# Patient Record
Sex: FEMALE | Race: WHITE | NOT HISPANIC OR LATINO | Employment: FULL TIME | URBAN - METROPOLITAN AREA
[De-identification: names, ages, dates, MRNs, and addresses within clinical notes are randomized per-mention and may not be internally consistent; named-entity substitution may affect disease eponyms.]

---

## 2017-05-10 ENCOUNTER — HISTORICAL (OUTPATIENT)
Dept: LAB | Facility: HOSPITAL | Age: 37
End: 2017-05-10

## 2017-08-16 LAB — CRC RECOMMENDATION EXT: NORMAL

## 2018-11-15 ENCOUNTER — HISTORICAL (OUTPATIENT)
Dept: LAB | Facility: HOSPITAL | Age: 38
End: 2018-11-15

## 2019-04-08 ENCOUNTER — HISTORICAL (OUTPATIENT)
Dept: LAB | Facility: HOSPITAL | Age: 39
End: 2019-04-08

## 2019-11-19 LAB — HUMAN PAPILLOMAVIRUS (HPV): NORMAL

## 2020-01-27 ENCOUNTER — HISTORICAL (OUTPATIENT)
Dept: LAB | Facility: HOSPITAL | Age: 40
End: 2020-01-27

## 2021-01-08 LAB — BCS RECOMMENDATION EXT: NORMAL

## 2022-04-07 ENCOUNTER — HISTORICAL (OUTPATIENT)
Dept: ADMINISTRATIVE | Facility: HOSPITAL | Age: 42
End: 2022-04-07

## 2022-04-23 VITALS
DIASTOLIC BLOOD PRESSURE: 78 MMHG | WEIGHT: 171.94 LBS | SYSTOLIC BLOOD PRESSURE: 122 MMHG | BODY MASS INDEX: 30.46 KG/M2 | HEIGHT: 63 IN

## 2022-06-13 PROBLEM — I82.409 DEEP VEIN THROMBOSIS (DVT): Status: RESOLVED | Noted: 2022-06-13 | Resolved: 2022-06-13

## 2022-06-13 PROBLEM — J45.909 ASTHMA: Status: ACTIVE | Noted: 2022-06-13

## 2022-06-13 PROBLEM — G43.909 MIGRAINE HEADACHE: Status: ACTIVE | Noted: 2022-06-13

## 2022-06-13 PROBLEM — G47.8 ABNORMAL RAPID EYE MOVEMENT SLEEP: Status: ACTIVE | Noted: 2022-06-13

## 2022-06-13 PROBLEM — R53.83 FATIGUE: Status: ACTIVE | Noted: 2022-06-13

## 2022-06-13 PROBLEM — K21.9 GASTROESOPHAGEAL REFLUX DISEASE: Status: ACTIVE | Noted: 2022-06-13

## 2022-06-13 PROBLEM — F41.9 ANXIETY: Status: ACTIVE | Noted: 2022-06-13

## 2022-06-13 PROBLEM — K44.9 HIATAL HERNIA: Status: ACTIVE | Noted: 2022-06-13

## 2022-06-13 PROBLEM — Z86.718 HISTORY OF DVT (DEEP VEIN THROMBOSIS): Status: ACTIVE | Noted: 2022-06-13

## 2022-06-13 PROBLEM — D64.9 ANEMIA: Status: ACTIVE | Noted: 2022-06-13

## 2022-06-13 PROBLEM — R11.0 NAUSEA: Status: ACTIVE | Noted: 2022-06-13

## 2022-06-13 PROBLEM — F90.0 ATTENTION DEFICIT HYPERACTIVITY DISORDER (ADHD), PREDOMINANTLY INATTENTIVE TYPE: Status: ACTIVE | Noted: 2022-06-13

## 2022-06-13 PROBLEM — I10 HYPERTENSION: Status: ACTIVE | Noted: 2022-06-13

## 2022-06-13 PROBLEM — I82.409 DEEP VEIN THROMBOSIS (DVT): Status: ACTIVE | Noted: 2022-06-13

## 2022-06-13 PROCEDURE — 85379 FIBRIN DEGRADATION QUANT: CPT | Performed by: FAMILY MEDICINE

## 2022-07-05 ENCOUNTER — OFFICE VISIT (OUTPATIENT)
Dept: URGENT CARE | Facility: CLINIC | Age: 42
End: 2022-07-05
Payer: COMMERCIAL

## 2022-07-05 VITALS
WEIGHT: 176.38 LBS | RESPIRATION RATE: 18 BRPM | SYSTOLIC BLOOD PRESSURE: 117 MMHG | HEIGHT: 64 IN | DIASTOLIC BLOOD PRESSURE: 80 MMHG | BODY MASS INDEX: 30.11 KG/M2 | TEMPERATURE: 99 F | HEART RATE: 75 BPM | OXYGEN SATURATION: 100 %

## 2022-07-05 DIAGNOSIS — M79.5 FOREIGN BODY (FB) IN SOFT TISSUE: Primary | ICD-10-CM

## 2022-07-05 DIAGNOSIS — H60.02 ABSCESS OF TRAGUS OF LEFT EAR: ICD-10-CM

## 2022-07-05 PROCEDURE — 99213 OFFICE O/P EST LOW 20 MIN: CPT | Mod: S$PBB,,, | Performed by: NURSE PRACTITIONER

## 2022-07-05 PROCEDURE — 99213 PR OFFICE/OUTPT VISIT, EST, LEVL III, 20-29 MIN: ICD-10-PCS | Mod: S$PBB,,, | Performed by: NURSE PRACTITIONER

## 2022-07-05 PROCEDURE — 99214 OFFICE O/P EST MOD 30 MIN: CPT | Mod: PBBFAC | Performed by: NURSE PRACTITIONER

## 2022-07-05 RX ORDER — IBUPROFEN 800 MG/1
800 TABLET ORAL EVERY 6 HOURS PRN
Qty: 12 TABLET | Refills: 0 | Status: SHIPPED | OUTPATIENT
Start: 2022-07-05 | End: 2023-07-11

## 2022-07-05 RX ORDER — LIDOCAINE HYDROCHLORIDE 20 MG/ML
5 INJECTION, SOLUTION INFILTRATION; PERINEURAL ONCE
Status: COMPLETED | OUTPATIENT
Start: 2022-07-05 | End: 2022-07-05

## 2022-07-05 RX ORDER — MUPIROCIN 20 MG/G
OINTMENT TOPICAL
Qty: 22 G | Refills: 1 | Status: SHIPPED | OUTPATIENT
Start: 2022-07-05

## 2022-07-05 RX ORDER — CEPHALEXIN 500 MG/1
500 CAPSULE ORAL EVERY 8 HOURS
Qty: 15 CAPSULE | Refills: 0 | Status: SHIPPED | OUTPATIENT
Start: 2022-07-05 | End: 2022-07-10

## 2022-07-05 RX ADMIN — LIDOCAINE HYDROCHLORIDE 5 ML: 20 INJECTION, SOLUTION INFILTRATION; PERINEURAL at 08:07

## 2022-07-06 NOTE — PROGRESS NOTES
"Subjective:      Patient ID: Kortney Walker is a 42 y.o. female.    Chief Complaint: Ear Injury (Lt ear         ear ring "stuck" in ear piercing site x 1wk)      42-year-old female presents to the clinic reporting Left ear tragus abscess formation due to imbedded ear ring.  The swelling started few days ago, the person who had inserted the earring was unable to take it out due to swelling. Patient state tetanus shot up-to-date. Denies headache or blurry vision or dizziness.    Review of Systems   HENT: Positive for ear discharge and ear pain.    All other systems reviewed and are negative.      /80   Pulse 75   Temp 99 °F (37.2 °C)   Resp 18   Ht 5' 3.78" (1.62 m)   Wt 80 kg (176 lb 5.9 oz)   SpO2 100%   BMI 30.48 kg/m²      Current Outpatient Medications:     albuterol (PROVENTIL/VENTOLIN HFA) 90 mcg/actuation inhaler, INHALE TWO PUFFS BY MOUTH EVERY SIX HOURS AS DIRECTED, Disp: , Rfl:     cetirizine (ZYRTEC) 10 MG tablet, Take 10 mg by mouth once daily., Disp: , Rfl:     erenumab-aooe (AIMOVIG AUTOINJECTOR) 70 mg/mL autoinjector, Inject 70 mg into the skin., Disp: , Rfl:     gabapentin (NEURONTIN) 100 MG capsule, Take 2 capsules (200 mg total) by mouth 3 (three) times daily., Disp: 180 capsule, Rfl: 11    lisinopriL (PRINIVIL,ZESTRIL) 20 MG tablet,  See Instructions, TAKE 1 TABLET BY MOUTH EVERY MORNING, # 90 tab(s), 1 Refill(s), Pharmacy: [facility], 160, cm, Height/Length Dosing, 01/10/22 13:20:00 CST, 78, kg, Weight Dosing, 01/10/22 13:20:00 CST, Disp: , Rfl:     VIIBRYD 20 mg Tab, , Disp: , Rfl:     cephALEXin (KEFLEX) 500 MG capsule, Take 1 capsule (500 mg total) by mouth every 8 (eight) hours. for 5 days, Disp: 15 capsule, Rfl: 0    fluticasone propionate (FLONASE) 50 mcg/actuation nasal spray, Spray 1 squirt in the nostrils twice a day as needed, Disp: , Rfl:     ibuprofen (ADVIL,MOTRIN) 800 MG tablet, Take 1 tablet (800 mg total) by mouth every 6 (six) hours as " needed for Pain., Disp: 12 tablet, Rfl: 0    mupirocin (BACTROBAN) 2 % ointment, Apply to affected area 3 times daily For 5 days, Disp: 22 g, Rfl: 1    tacrolimus (PROTOPIC) 0.1 % ointment,  See Instructions, TOP BID apply and rub in well, # 454 gm, 1 Refill(s), Pharmacy: [facility], 160, cm, Height/Length Dosing, 01/10/22 13:20:00 CST, 78, kg, Weight Dosing, 01/10/22 13:20:00 CST, Disp: , Rfl:     valACYclovir (VALTREX) 1000 MG tablet, Take 2,000 mg by mouth 2 (two) times daily., Disp: , Rfl:     Current Facility-Administered Medications:     [COMPLETED] LIDOcaine HCL 20 mg/ml (2%) injection 5 mL, 5 mL, Intradermal, Once, Ted Anders FNP, 5 mL at 07/05/22 2015    Objective:     Physical Exam  Vitals and nursing note reviewed.   Constitutional:       General: She is not in acute distress.     Appearance: Normal appearance. She is not ill-appearing or toxic-appearing.   HENT:      Head: Normocephalic and atraumatic.      Right Ear: Tympanic membrane, ear canal and external ear normal.      Left Ear: Tympanic membrane, ear canal and external ear normal.      Ears:        Comments: Left ear tragus  earing stuck to infection and swelling. Erythemic, tender to touch, infected.     Nose: Nose normal.      Mouth/Throat:      Mouth: Mucous membranes are moist.      Pharynx: Oropharynx is clear.   Eyes:      Extraocular Movements: Extraocular movements intact.      Conjunctiva/sclera: Conjunctivae normal.      Pupils: Pupils are equal, round, and reactive to light.   Cardiovascular:      Rate and Rhythm: Normal rate and regular rhythm.      Pulses: Normal pulses.      Heart sounds: Normal heart sounds.   Pulmonary:      Effort: Pulmonary effort is normal. No respiratory distress.      Breath sounds: Normal breath sounds. No wheezing.   Musculoskeletal:         General: Normal range of motion.      Cervical back: Normal range of motion and neck supple. No rigidity or tenderness.   Lymphadenopathy:       Cervical: No cervical adenopathy.   Skin:     General: Skin is warm.      Capillary Refill: Capillary refill takes less than 2 seconds.   Neurological:      General: No focal deficit present.      Mental Status: She is alert and oriented to person, place, and time. Mental status is at baseline.   Psychiatric:         Mood and Affect: Mood normal.         Behavior: Behavior normal.         Thought Content: Thought content normal.         Judgment: Judgment normal.      procedure: patient gave verbal and written consent to remove hearing from left ear tragus. Time-out taken. MARCOS Corrigan to assist. Area cleaned with chlorhexidine topical, injected area with 25 gauge short needle, 3 mL lidocaine 2% without epi. It was pulled opposite direction. Patient tolerated procedure with minor discomfort, minimal bleeding. Patient decline x-rays to rule out any residual foreign body. Cleaned and covered with gauze. Keep the area clean admitting medications as prescribed.  Patient verbalized.  Assessment:     Problem List Items Addressed This Visit    None     Visit Diagnoses     Foreign body (FB) in soft tissue    -  Primary    Relevant Medications    LIDOcaine HCL 20 mg/ml (2%) injection 5 mL (Completed) (Start on 7/5/2022  8:15 PM)    cephALEXin (KEFLEX) 500 MG capsule    ibuprofen (ADVIL,MOTRIN) 800 MG tablet    mupirocin (BACTROBAN) 2 % ointment    Abscess of tragus of left ear        Relevant Medications    LIDOcaine HCL 20 mg/ml (2%) injection 5 mL (Completed) (Start on 7/5/2022  8:15 PM)    cephALEXin (KEFLEX) 500 MG capsule    ibuprofen (ADVIL,MOTRIN) 800 MG tablet    mupirocin (BACTROBAN) 2 % ointment          Plan:   Discussed physical exam findings with patient,  Removed from left ear tragus.  Discussed medication  prescribed with patient Rx Keflex 500 mg p.o. t.i.d. for 5 days, topical mupirocin ointment 3 times a day for 5 days, ibuprofen 800 mg q.6 hours as needed for pain and discomfort. Stay hydrated with  fluids specially water, take medication with food.  Instructed patient to notify his/ her primary care provider regarding the visit today and schedule a follow-up appointment in 2-3 days if needed   instructed patient to come back to the clinic or go to nearest emergency room department if symptoms worsens or no improvement or for any other reason   questions elicited and answered  Patient verbalized understanding of discharge instructions, verbalizes understanding of medication prescribed , verbalizes understanding to read discharge instructions    Foreign body (FB) in soft tissue  -     LIDOcaine HCL 20 mg/ml (2%) injection 5 mL  -     cephALEXin (KEFLEX) 500 MG capsule; Take 1 capsule (500 mg total) by mouth every 8 (eight) hours. for 5 days  Dispense: 15 capsule; Refill: 0  -     ibuprofen (ADVIL,MOTRIN) 800 MG tablet; Take 1 tablet (800 mg total) by mouth every 6 (six) hours as needed for Pain.  Dispense: 12 tablet; Refill: 0  -     mupirocin (BACTROBAN) 2 % ointment; Apply to affected area 3 times daily For 5 days  Dispense: 22 g; Refill: 1    Abscess of tragus of left ear  -     LIDOcaine HCL 20 mg/ml (2%) injection 5 mL  -     cephALEXin (KEFLEX) 500 MG capsule; Take 1 capsule (500 mg total) by mouth every 8 (eight) hours. for 5 days  Dispense: 15 capsule; Refill: 0  -     ibuprofen (ADVIL,MOTRIN) 800 MG tablet; Take 1 tablet (800 mg total) by mouth every 6 (six) hours as needed for Pain.  Dispense: 12 tablet; Refill: 0  -     mupirocin (BACTROBAN) 2 % ointment; Apply to affected area 3 times daily For 5 days  Dispense: 22 g; Refill: 1         Recent Lab Results     None            This note was created with the assistance of a voice recognition software or  phone dictation. There may be transcription errors as a result of using this technology, however minimal effort has been made to assure accuracy of transcription, but any obvious errors or omissions should be clarified with the author of the  document.

## 2022-11-17 ENCOUNTER — DOCUMENTATION ONLY (OUTPATIENT)
Dept: PRIMARY CARE CLINIC | Facility: CLINIC | Age: 42
End: 2022-11-17
Payer: COMMERCIAL

## 2023-07-22 ENCOUNTER — HOSPITAL ENCOUNTER (EMERGENCY)
Facility: HOSPITAL | Age: 43
Discharge: HOME | End: 2023-07-22
Attending: STUDENT IN AN ORGANIZED HEALTH CARE EDUCATION/TRAINING PROGRAM
Payer: COMMERCIAL

## 2023-07-22 VITALS
OXYGEN SATURATION: 97 % | DIASTOLIC BLOOD PRESSURE: 73 MMHG | SYSTOLIC BLOOD PRESSURE: 109 MMHG | RESPIRATION RATE: 17 BRPM | TEMPERATURE: 98 F | HEART RATE: 84 BPM

## 2023-07-22 DIAGNOSIS — S93.402A SPRAIN OF LEFT ANKLE, UNSPECIFIED LIGAMENT, INITIAL ENCOUNTER: Primary | ICD-10-CM
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
## 2023-07-22 PROCEDURE — 63600175 PHARM REV CODE 636 W HCPCS: Performed by: PHYSICIAN ASSISTANT

## 2023-07-22 PROCEDURE — 99284 EMERGENCY DEPT VISIT MOD MDM: CPT

## 2023-07-22 PROCEDURE — 96372 THER/PROPH/DIAG INJ SC/IM: CPT | Performed by: PHYSICIAN ASSISTANT

## 2023-07-22 RX ORDER — DICLOFENAC SODIUM 50 MG/1
50 TABLET, DELAYED RELEASE ORAL 3 TIMES DAILY
Qty: 21 TABLET | Refills: 0 | Status: SHIPPED | OUTPATIENT
Start: 2023-07-22 | End: 2023-07-29

## 2023-07-22 RX ORDER — KETOROLAC TROMETHAMINE 30 MG/ML
60 INJECTION, SOLUTION INTRAMUSCULAR; INTRAVENOUS
Status: COMPLETED | OUTPATIENT
Start: 2023-07-22 | End: 2023-07-22

## 2023-07-22 RX ORDER — TIZANIDINE 4 MG/1
4 TABLET ORAL EVERY 8 HOURS
Qty: 21 TABLET | Refills: 0 | Status: SHIPPED | OUTPATIENT
Start: 2023-07-22 | End: 2023-07-29

## 2023-07-22 RX ADMIN — KETOROLAC TROMETHAMINE 60 MG: 60 INJECTION, SOLUTION INTRAMUSCULAR at 10:07

## 2023-07-22 NOTE — DISCHARGE INSTRUCTIONS
Ice and elevate, 20 minutes on and 20 minutes off.  Weight bear as tolerated with crutches use Ace wrap to help decrease swelling and provide support.    You have been prescribed Diclofenac for pain. This is an Non-Steroidal Anti-Inflammatory (NSAID) Medication. Please do not take any additional NSAIDs while you are taking this medication including (Advil, Aleve, Motrin, Ibuprofen, Mobic\meloxicam, Naprosyn, Toradol, ketoralac, etc.). Please stop taking this medication if you experience: weakness, itching, yellow skin or eyes, joint pains, vomiting blood, blood or black stools, unusual weight gain, or swelling in your arms, legs, hands, or feet.     You have been prescribed Tizanidine for muscle spasms/pain. Please do not take this medication while working, drinking alcohol, swimming, or while driving/operating heavy machinery. This medication may cause drowsiness, dizziness, impair judgment, and reduce physical capabilities.You should not drive, operate heavy machinery, or make life changing decisions while taking this medication.

## 2023-07-22 NOTE — ED PROVIDER NOTES
Encounter Date: 7/22/2023       History     Chief Complaint   Patient presents with    Ankle Pain     Left ankle pain after twisting it in a wrong way while doing yard work this AM. +2 pedal pulse     43-year-old female presents to ED for evaluation left ankle pain and swelling.  Patient reports she was mowing her yd when she twisted her foot in a pothole causing her to fall down.  Denies hitting her head or loss of consciousness.  Complains of pain and swelling to her left ankle.  Unable to walk due to pain.    The history is provided by the patient. No  was used.   Review of patient's allergies indicates:   Allergen Reactions    Beta-blockers (beta-adrenergic blocking agts)      Other reaction(s): Asthma    Codeine     Sulfa (sulfonamide antibiotics)      Past Medical History:   Diagnosis Date    ADD (attention deficit disorder)     Anemia, unspecified     Anxiety disorder, unspecified     Fatigue     GERD (gastroesophageal reflux disease)     Hiatal hernia     History of DVT (deep vein thrombosis)     HSV-1 infection     HTN (hypertension)     Migraine     Mild intermittent asthma, uncomplicated     Obesity, unspecified     Other seasonal allergic rhinitis     REM behavioral disorder      Past Surgical History:   Procedure Laterality Date    DILATION AND CURETTAGE OF UTERUS      HYSTERECTOMY      TONSILLECTOMY       Family History   Problem Relation Age of Onset    Hypertension Mother     Coronary artery disease Mother     Hypertension Father     Epilepsy Father      Social History     Tobacco Use    Smoking status: Never    Smokeless tobacco: Never   Substance Use Topics    Alcohol use: Never    Drug use: Never     Review of Systems   Constitutional:  Negative for chills, fatigue and fever.   Respiratory:  Negative for shortness of breath.    Cardiovascular:  Negative for chest pain.   Gastrointestinal:  Negative for abdominal pain, diarrhea, nausea and vomiting.   Genitourinary:  Negative  for dysuria, flank pain, frequency and urgency.   Musculoskeletal:  Positive for arthralgias and myalgias. Negative for back pain.   All other systems reviewed and are negative.    Physical Exam     Initial Vitals [07/22/23 1001]   BP Pulse Resp Temp SpO2   109/73 84 17 98.4 °F (36.9 °C) 97 %      MAP       --         Physical Exam    Nursing note and vitals reviewed.  Constitutional: She appears well-developed and well-nourished.   HENT:   Head: Normocephalic and atraumatic.   Right Ear: Tympanic membrane and external ear normal.   Left Ear: Tympanic membrane and external ear normal.   Mouth/Throat: Uvula is midline, oropharynx is clear and moist and mucous membranes are normal. No trismus in the jaw. No uvula swelling. No oropharyngeal exudate, posterior oropharyngeal edema or posterior oropharyngeal erythema.   Eyes: Conjunctivae are normal. Pupils are equal, round, and reactive to light.   Neck: Neck supple.   Normal range of motion.  Cardiovascular:  Normal rate, regular rhythm and normal heart sounds.           Pulmonary/Chest: Breath sounds normal. She has no wheezes. She has no rhonchi. She has no rales.   Abdominal: Abdomen is soft. Bowel sounds are normal. There is no abdominal tenderness. There is no rebound and no guarding.   Musculoskeletal:      Cervical back: Normal range of motion and neck supple.      Left ankle: Swelling present. No deformity or ecchymosis. Tenderness present over the lateral malleolus. Decreased range of motion.      Comments: Lateral malleous swelling noted. Decreased ROM due to pain.      Neurological: She is alert and oriented to person, place, and time. She has normal strength. No cranial nerve deficit or sensory deficit. GCS score is 15. GCS eye subscore is 4. GCS verbal subscore is 5. GCS motor subscore is 6.   Skin: Skin is warm and dry.   Psychiatric: She has a normal mood and affect.       ED Course   Procedures  Labs Reviewed - No data to display       Imaging Results               X-Ray Ankle Complete Left (Final result)  Result time 07/22/23 10:21:54      Final result by Jac Velasco MD (07/22/23 10:21:54)                   Impression:      As above.  Soft tissue edema with no underlying fracture.  Ligamentous injury is not excluded.      Electronically signed by: Jac Velasco  Date:    07/22/2023  Time:    10:21               Narrative:    EXAMINATION:  XR ANKLE COMPLETE 3 VIEW LEFT    CLINICAL HISTORY:  Unspecified fall, initial encounter    TECHNIQUE:  Radiographs of the left ankle with AP, lateral and oblique  views.    COMPARISON:  No prior imaging available for comparison    FINDINGS:  No displaced fracture.  Soft tissue edema overlies the lateral malleolus.                                       Medications   ketorolac injection 60 mg (60 mg Intramuscular Given 7/22/23 1013)     Medical Decision Making:   Initial Assessment:   43-year-old female presents to ED for evaluation left ankle pain and swelling.  Patient reports she was mowing her yd when she twisted her foot in a pothole causing her to fall down.  Denies hitting her head or loss of consciousness.  Complains of pain and swelling to her left ankle.  Unable to walk due to pain.  Differential Diagnosis:   Ankle sprain, fracture, ligamentous injury  Clinical Tests:   Radiological Study: Ordered and Reviewed  ED Management:    43-year-old female presents to ED for evaluation of left ankle pain and swelling after twisting her foot while mowing the yd.  Patient with full range of motion and neuro intact.  X-ray negative for fracture.  Discussed possible sprain.  Will Ace wrap and give crutches.  Will give short course of NSAIDs and muscle relaxers.  Return ED precautions given.  Patient verbalizes understanding and agrees with plan of care.                        Clinical Impression:   Final diagnoses:  [W19.XXXA] Fall  [S93.402A] Sprain of left ankle, unspecified ligament, initial encounter (Primary)        ED  Disposition Condition    Discharge Stable          ED Prescriptions       Medication Sig Dispense Start Date End Date Auth. Provider    diclofenac (VOLTAREN) 50 MG EC tablet Take 1 tablet (50 mg total) by mouth 3 (three) times daily. for 7 days 21 tablet 7/22/2023 7/29/2023 DREW Castorena    tiZANidine (ZANAFLEX) 4 MG tablet Take 1 tablet (4 mg total) by mouth every 8 (eight) hours. for 7 days 21 tablet 7/22/2023 7/29/2023 DREW Castorena          Follow-up Information       Follow up With Specialties Details Why Contact Info    Ken Noriega MD Family Medicine   [address] [place] [address].  [facility] [id number]  [phone]               DREW Castorena  07/22/23 9096

## 2024-02-26 ENCOUNTER — OFFICE VISIT (OUTPATIENT)
Dept: URGENT CARE | Facility: CLINIC | Age: 44
End: 2024-02-26
Payer: COMMERCIAL

## 2024-02-26 VITALS
OXYGEN SATURATION: 100 % | SYSTOLIC BLOOD PRESSURE: 110 MMHG | HEART RATE: 77 BPM | BODY MASS INDEX: 28.7 KG/M2 | WEIGHT: 162 LBS | RESPIRATION RATE: 16 BRPM | TEMPERATURE: 98 F | DIASTOLIC BLOOD PRESSURE: 70 MMHG | HEIGHT: 63 IN

## 2024-02-26 DIAGNOSIS — J02.9 SORE THROAT: Primary | ICD-10-CM

## 2024-02-26 LAB
CTP QC/QA: YES
MOLECULAR STREP A: NEGATIVE
POC MOLECULAR INFLUENZA A AGN: NEGATIVE
POC MOLECULAR INFLUENZA B AGN: NEGATIVE
SARS-COV-2 RDRP RESP QL NAA+PROBE: NEGATIVE

## 2024-02-26 PROCEDURE — 99213 OFFICE O/P EST LOW 20 MIN: CPT | Mod: ,,, | Performed by: FAMILY MEDICINE

## 2024-02-26 PROCEDURE — 87502 INFLUENZA DNA AMP PROBE: CPT | Mod: QW,,, | Performed by: FAMILY MEDICINE

## 2024-02-26 PROCEDURE — 87635 SARS-COV-2 COVID-19 AMP PRB: CPT | Mod: QW,,, | Performed by: FAMILY MEDICINE

## 2024-02-26 PROCEDURE — 87651 STREP A DNA AMP PROBE: CPT | Mod: QW,,, | Performed by: FAMILY MEDICINE

## 2024-02-26 RX ORDER — FREMANEZUMAB-VFRM 225 MG/1.5ML
INJECTION SUBCUTANEOUS
COMMUNITY
Start: 2023-12-08

## 2024-02-26 RX ORDER — BENZONATATE 200 MG/1
200 CAPSULE ORAL 3 TIMES DAILY PRN
Qty: 15 CAPSULE | Refills: 0 | Status: SHIPPED | OUTPATIENT
Start: 2024-02-26 | End: 2024-03-02

## 2024-02-26 RX ORDER — PREDNISONE 20 MG/1
40 TABLET ORAL DAILY
Qty: 10 TABLET | Refills: 0 | Status: SHIPPED | OUTPATIENT
Start: 2024-02-26 | End: 2024-03-02

## 2024-02-26 RX ORDER — PROMETHAZINE HYDROCHLORIDE AND DEXTROMETHORPHAN HYDROBROMIDE 6.25; 15 MG/5ML; MG/5ML
5 SYRUP ORAL EVERY 4 HOURS PRN
Qty: 120 ML | Refills: 0 | Status: SHIPPED | OUTPATIENT
Start: 2024-02-26 | End: 2024-03-01

## 2024-02-26 NOTE — PROGRESS NOTES
Patient ID: [id number]     Chief Complaint: upper respiratory tract infection symptoms    History of Present Illness:     Kortney Walker is a 44 y.o. female  with a history of asthma, GERD, DVTs, hypertension, seasonal allergic rhinitis, who presents today for symptoms of Cough (P[t c/o productive cough, fatigue, unknown of fever, sore throat. Symptomatic x2 days. )      Pt denies experiencing any fevers, chills, nausea, vomiting, difficulty breathing, dysphagia, or neck stiffness.    Past Medical History:     ----------------------------  ADD (attention deficit disorder)  Anemia, unspecified  Anxiety disorder, unspecified  Fatigue  GERD (gastroesophageal reflux disease)  Hiatal hernia  History of DVT (deep vein thrombosis)  HSV-1 infection  HTN (hypertension)  Migraine  Mild intermittent asthma, uncomplicated  Obesity, unspecified  Other seasonal allergic rhinitis  REM behavioral disorder     Past Surgical History:   Procedure Laterality Date    DILATION AND CURETTAGE OF UTERUS      HYSTERECTOMY      TONSILLECTOMY         Review of patient's allergies indicates:   Allergen Reactions    Beta-blockers (beta-adrenergic blocking agts)      Other reaction(s): Asthma    Codeine     Sulfa (sulfonamide antibiotics)        Outpatient Medications Marked as Taking for the 2/26/24 encounter (Office Visit) with Bk Lucero MD   Medication Sig Dispense Refill    AJOVY AUTOINJECTOR 225 mg/1.5 mL autoinjector Inject into the skin.      aspirin (ECOTRIN) 81 MG EC tablet Take 81 mg by mouth once daily.      lisinopriL (PRINIVIL,ZESTRIL) 20 MG tablet TAKE 1 TABLET EVERY MORNING 90 tablet 1       Social History     Socioeconomic History    Marital status: Single   Tobacco Use    Smoking status: Never     Passive exposure: Never    Smokeless tobacco: Never   Substance and Sexual Activity    Alcohol use: Never    Drug use: Never        Family History   Problem Relation Age of Onset    Hypertension Mother     Coronary  artery disease Mother     Hypertension Father     Epilepsy Father         Subjective:     Review of Systems   Constitutional:  Negative for chills, fever and malaise/fatigue.   HENT:  Positive for congestion. Negative for ear discharge, ear pain, sinus pain and sore throat.         Hoarseness   Respiratory:  Negative for cough, sputum production, shortness of breath, wheezing and stridor.    Gastrointestinal:  Negative for abdominal pain, diarrhea, nausea and vomiting.   Genitourinary:  Negative for dysuria, frequency and urgency.   Musculoskeletal:  Negative for neck pain.   Skin:  Negative for rash.   Neurological:  Negative for headaches.       Objective:     Vitals:    02/26/24 0813   BP: 110/70   Pulse: 77   Resp: 16   Temp: 98.3 °F (36.8 °C)     Body mass index is 28.7 kg/m².    Physical Exam  Constitutional:       General: She is not in acute distress.     Appearance: Normal appearance. She is not ill-appearing or toxic-appearing.   HENT:      Head: Normocephalic and atraumatic.      Right Ear: Tympanic membrane and ear canal normal.      Left Ear: Tympanic membrane and ear canal normal.      Nose: No congestion or rhinorrhea.      Mouth/Throat:      Pharynx: Oropharynx is clear. No oropharyngeal exudate or posterior oropharyngeal erythema.   Eyes:      General:         Right eye: No discharge.         Left eye: No discharge.      Extraocular Movements: Extraocular movements intact.      Conjunctiva/sclera: Conjunctivae normal.   Cardiovascular:      Rate and Rhythm: Normal rate and regular rhythm.      Heart sounds: Normal heart sounds. No murmur heard.     No gallop.   Pulmonary:      Effort: Pulmonary effort is normal. No respiratory distress.      Breath sounds: Normal breath sounds. No stridor. No wheezing, rhonchi or rales.   Chest:      Chest wall: No tenderness.   Musculoskeletal:      Cervical back: No rigidity or tenderness.   Lymphadenopathy:      Cervical: No cervical adenopathy.   Neurological:       Mental Status: She is alert and oriented to person, place, and time. Mental status is at baseline.   Psychiatric:         Mood and Affect: Mood normal.         Behavior: Behavior normal.         Assessment & Plan:       ICD-10-CM ICD-9-CM   1. Sore throat  J02.9 462        1. Sore throat  -     POCT COVID-19 Rapid Screening  -     POCT Influenza A/B Molecular  -     POCT Strep A, Molecular    Other orders  -     predniSONE (DELTASONE) 20 MG tablet; Take 2 tablets (40 mg total) by mouth once daily. for 5 days  Dispense: 10 tablet; Refill: 0  -     promethazine-dextromethorphan (PROMETHAZINE-DM) 6.25-15 mg/5 mL Syrp; Take 5 mLs by mouth every 4 (four) hours as needed.  Dispense: 120 mL; Refill: 0  -     benzonatate (TESSALON) 200 MG capsule; Take 1 capsule (200 mg total) by mouth 3 (three) times daily as needed for Cough.  Dispense: 15 capsule; Refill: 0         Strep negative, Influenza negative, and Covid negative.  Lungs clear, vitals stable, no acute distress.    We talked about symptoms, likely diagnoses and management. Mild bronchial wheezing suggestive of acute or developing bronchitis.  Discussed that the cough from bronchitis can last anywhere from 2-4 weeks.  Only symptomatic treatment is indicated at this time.  We will treat with steroids and cough suppressants.  We discussed warning signs and symptoms to monitor for and to seek medical care if they emerge. Pt will return  if symptoms change, worsen, or do not resolved within the expected time range.

## 2024-02-26 NOTE — PATIENT INSTRUCTIONS
Please take promethazine cough syrup and Tessalon Perles as needed for cough.  Please note that promethazine cough syrup can induce drowsiness.  Some patients prefer to take it only at night.    Prednisone once daily for up to 5 days    Drink plenty of fluids.      Get plenty of rest.      Tylenol or Motrin as needed.      Go to the ER with any significant change or worsening of symptoms.  Beyond the look out for worsening fatigue, shortness for breath, or fevers which may be a sign to return for re-evaluation of pneumonia.

## 2024-09-20 PROBLEM — G43.009 MIGRAINE WITHOUT AURA AND WITHOUT STATUS MIGRAINOSUS, NOT INTRACTABLE: Status: ACTIVE | Noted: 2022-06-13

## 2024-11-29 ENCOUNTER — HOSPITAL ENCOUNTER (EMERGENCY)
Facility: HOSPITAL | Age: 44
Discharge: HOME | End: 2024-11-29
Attending: EMERGENCY MEDICINE
Payer: COMMERCIAL

## 2024-11-29 VITALS
DIASTOLIC BLOOD PRESSURE: 84 MMHG | HEART RATE: 85 BPM | WEIGHT: 165 LBS | OXYGEN SATURATION: 99 % | RESPIRATION RATE: 18 BRPM | HEIGHT: 63 IN | TEMPERATURE: 98 F | SYSTOLIC BLOOD PRESSURE: 118 MMHG | BODY MASS INDEX: 29.23 KG/M2

## 2024-11-29 DIAGNOSIS — R10.9 LEFT FLANK PAIN: Primary | ICD-10-CM

## 2024-11-29 LAB
ALBUMIN SERPL-MCNC: 3.9 G/DL (ref 3.5–5)
ALBUMIN/GLOB SERPL: 1.4 RATIO (ref 1.1–2)
ALP SERPL-CCNC: 53 UNIT/L (ref 40–150)
ALT SERPL-CCNC: 17 UNIT/L (ref 0–55)
ANION GAP SERPL CALC-SCNC: 9 MEQ/L
AST SERPL-CCNC: 15 UNIT/L (ref 5–34)
B-HCG UR QL: NEGATIVE
BACTERIA #/AREA URNS AUTO: ABNORMAL /HPF
BASOPHILS # BLD AUTO: 0.01 X10(3)/MCL
BASOPHILS NFR BLD AUTO: 0.1 %
BILIRUB SERPL-MCNC: 0.6 MG/DL
BILIRUB UR QL STRIP.AUTO: NEGATIVE
BUN SERPL-MCNC: 16.6 MG/DL (ref 7–18.7)
CALCIUM SERPL-MCNC: 8.9 MG/DL (ref 8.4–10.2)
CHLORIDE SERPL-SCNC: 111 MMOL/L (ref 98–107)
CLARITY UR: CLEAR
CO2 SERPL-SCNC: 18 MMOL/L (ref 22–29)
COLOR UR AUTO: ABNORMAL
CREAT SERPL-MCNC: 0.86 MG/DL (ref 0.55–1.02)
CREAT/UREA NIT SERPL: 19
EOSINOPHIL # BLD AUTO: 0.01 X10(3)/MCL (ref 0–0.9)
EOSINOPHIL NFR BLD AUTO: 0.1 %
ERYTHROCYTE [DISTWIDTH] IN BLOOD BY AUTOMATED COUNT: 11.8 % (ref 11.5–17)
GFR SERPLBLD CREATININE-BSD FMLA CKD-EPI: >60 ML/MIN/1.73/M2
GLOBULIN SER-MCNC: 2.8 GM/DL (ref 2.4–3.5)
GLUCOSE SERPL-MCNC: 109 MG/DL (ref 74–100)
GLUCOSE UR QL STRIP: NORMAL
HCT VFR BLD AUTO: 40.4 % (ref 37–47)
HGB BLD-MCNC: 14.2 G/DL (ref 12–16)
HGB UR QL STRIP: NEGATIVE
IMM GRANULOCYTES # BLD AUTO: 0.05 X10(3)/MCL (ref 0–0.04)
IMM GRANULOCYTES NFR BLD AUTO: 0.4 %
KETONES UR QL STRIP: NEGATIVE
LEUKOCYTE ESTERASE UR QL STRIP: 25
LYMPHOCYTES # BLD AUTO: 0.88 X10(3)/MCL (ref 0.6–4.6)
LYMPHOCYTES NFR BLD AUTO: 7.3 %
MCH RBC QN AUTO: 30.9 PG (ref 27–31)
MCHC RBC AUTO-ENTMCNC: 35.1 G/DL (ref 33–36)
MCV RBC AUTO: 88 FL (ref 80–94)
MONOCYTES # BLD AUTO: 0.35 X10(3)/MCL (ref 0.1–1.3)
MONOCYTES NFR BLD AUTO: 2.9 %
MUCOUS THREADS URNS QL MICRO: ABNORMAL /LPF
NEUTROPHILS # BLD AUTO: 10.81 X10(3)/MCL (ref 2.1–9.2)
NEUTROPHILS NFR BLD AUTO: 89.2 %
NITRITE UR QL STRIP: NEGATIVE
NRBC BLD AUTO-RTO: 0 %
PH UR STRIP: 5.5 [PH]
PLATELET # BLD AUTO: 313 X10(3)/MCL (ref 130–400)
PMV BLD AUTO: 9.3 FL (ref 7.4–10.4)
POTASSIUM SERPL-SCNC: 4.4 MMOL/L (ref 3.5–5.1)
PROT SERPL-MCNC: 6.7 GM/DL (ref 6.4–8.3)
PROT UR QL STRIP: NEGATIVE
RBC # BLD AUTO: 4.59 X10(6)/MCL (ref 4.2–5.4)
RBC #/AREA URNS AUTO: ABNORMAL /HPF
SODIUM SERPL-SCNC: 138 MMOL/L (ref 136–145)
SP GR UR STRIP.AUTO: 1.02 (ref 1–1.03)
SQUAMOUS #/AREA URNS LPF: ABNORMAL /HPF
UROBILINOGEN UR STRIP-ACNC: NORMAL
WBC # BLD AUTO: 12.11 X10(3)/MCL (ref 4.5–11.5)
WBC #/AREA URNS AUTO: ABNORMAL /HPF

## 2024-11-29 PROCEDURE — 96372 THER/PROPH/DIAG INJ SC/IM: CPT | Performed by: EMERGENCY MEDICINE

## 2024-11-29 PROCEDURE — 81001 URINALYSIS AUTO W/SCOPE: CPT | Performed by: EMERGENCY MEDICINE

## 2024-11-29 PROCEDURE — 99285 EMERGENCY DEPT VISIT HI MDM: CPT | Mod: 25

## 2024-11-29 PROCEDURE — 96375 TX/PRO/DX INJ NEW DRUG ADDON: CPT

## 2024-11-29 PROCEDURE — 80053 COMPREHEN METABOLIC PANEL: CPT | Performed by: EMERGENCY MEDICINE

## 2024-11-29 PROCEDURE — 96374 THER/PROPH/DIAG INJ IV PUSH: CPT

## 2024-11-29 PROCEDURE — 63600175 PHARM REV CODE 636 W HCPCS: Performed by: EMERGENCY MEDICINE

## 2024-11-29 PROCEDURE — 81025 URINE PREGNANCY TEST: CPT | Performed by: EMERGENCY MEDICINE

## 2024-11-29 PROCEDURE — 85025 COMPLETE CBC W/AUTO DIFF WBC: CPT | Performed by: EMERGENCY MEDICINE

## 2024-11-29 RX ORDER — ONDANSETRON HYDROCHLORIDE 2 MG/ML
4 INJECTION, SOLUTION INTRAVENOUS
Status: COMPLETED | OUTPATIENT
Start: 2024-11-29 | End: 2024-11-29

## 2024-11-29 RX ORDER — METHOCARBAMOL 500 MG/1
1000 TABLET, FILM COATED ORAL 3 TIMES DAILY
Qty: 30 TABLET | Refills: 0 | Status: SHIPPED | OUTPATIENT
Start: 2024-11-29 | End: 2024-12-04

## 2024-11-29 RX ORDER — MELOXICAM 15 MG/1
15 TABLET ORAL DAILY
Qty: 20 TABLET | Refills: 0 | Status: SHIPPED | OUTPATIENT
Start: 2024-11-29 | End: 2024-12-19

## 2024-11-29 RX ORDER — MORPHINE SULFATE 4 MG/ML
4 INJECTION, SOLUTION INTRAMUSCULAR; INTRAVENOUS
Status: COMPLETED | OUTPATIENT
Start: 2024-11-29 | End: 2024-11-29

## 2024-11-29 RX ORDER — KETOROLAC TROMETHAMINE 30 MG/ML
60 INJECTION, SOLUTION INTRAMUSCULAR; INTRAVENOUS
Status: COMPLETED | OUTPATIENT
Start: 2024-11-29 | End: 2024-11-29

## 2024-11-29 RX ADMIN — KETOROLAC TROMETHAMINE 60 MG: 30 INJECTION, SOLUTION INTRAMUSCULAR at 09:11

## 2024-11-29 RX ADMIN — ONDANSETRON 4 MG: 2 INJECTION INTRAMUSCULAR; INTRAVENOUS at 11:11

## 2024-11-29 RX ADMIN — MORPHINE SULFATE 4 MG: 4 INJECTION INTRAVENOUS at 11:11

## 2024-11-29 NOTE — ED PROVIDER NOTES
Encounter Date: 11/29/2024    SCRIBE #1 NOTE: I, Ray Ramos, am scribing for, and in the presence of,  Efrain Moore MD. I have scribed the following portions of the note - Other sections scribed: HPI, ROS, PE.       History     Chief Complaint   Patient presents with    Flank Pain     Pt. C/o left sided flank pain radiates to left lower abd started x3 days ago with worsening since then with chills. Reports recent UTI x2 weeks ago. Hx of blood clots.. denies hx of kidney stones, hematuria, dysuria      Patient is a 43 yo female with a PMHx of GERD, hiatal hernia, DVT, and HSV-1 presenting to the ED with complaints of left flank pain that onset 3 days ago. Pt reports she began having significant pain in her left flank that radiates to her left abdomen with associated chills, dark urine, and slight SOB 3 days ago. Pt states she was dx with a UTI 2 weeks ago and has a hx of blood clots in her right tibial vein. Pt denies any rash, swelling, or hematuria.     The history is provided by the patient. No  was used.     Review of patient's allergies indicates:   Allergen Reactions    Beta-blockers (beta-adrenergic blocking agts)      Other reaction(s): Asthma    Codeine     Sulfa (sulfonamide antibiotics)      Past Medical History:   Diagnosis Date    ADD (attention deficit disorder)     Fatigue     GERD (gastroesophageal reflux disease)     Hiatal hernia     History of DVT (deep vein thrombosis)     HSV-1 infection     Mild intermittent asthma, uncomplicated     Obesity, unspecified     Other seasonal allergic rhinitis     REM behavioral disorder      Past Surgical History:   Procedure Laterality Date    DILATION AND CURETTAGE OF UTERUS      HYSTERECTOMY      TONSILLECTOMY       Family History   Problem Relation Name Age of Onset    Hypertension Mother      Coronary artery disease Mother      Hypertension Father      Epilepsy Father       Social History     Tobacco Use    Smoking status:  Never     Passive exposure: Never    Smokeless tobacco: Never   Substance Use Topics    Alcohol use: Never    Drug use: Never     Review of Systems   Constitutional:  Positive for chills.   Respiratory:  Positive for shortness of breath.    Cardiovascular:  Negative for leg swelling.   Gastrointestinal:  Positive for abdominal pain.   Genitourinary:  Positive for flank pain. Negative for hematuria.   Skin:  Negative for rash.       Physical Exam     Initial Vitals [11/29/24 0844]   BP Pulse Resp Temp SpO2   118/84 97 18 98 °F (36.7 °C) 96 %      MAP       --         Physical Exam    Nursing note and vitals reviewed.  Constitutional: She appears well-developed. No distress.   HENT:   Head: Normocephalic and atraumatic. Mouth/Throat: Oropharynx is clear and moist.   Eyes: Conjunctivae and EOM are normal. Pupils are equal, round, and reactive to light.   Neck: Neck supple.   Cardiovascular:  Normal rate and regular rhythm.           No murmur heard.  Pulmonary/Chest: Breath sounds normal. No respiratory distress. She exhibits no tenderness.   Abdominal: Abdomen is soft. Bowel sounds are normal. She exhibits no distension. There is no abdominal tenderness.   There is left CVA tenderness.   Musculoskeletal:         General: Normal range of motion.      Cervical back: Neck supple.      Lumbar back: Normal. Normal range of motion.     Neurological: She is alert and oriented to person, place, and time. She has normal strength. No cranial nerve deficit or sensory deficit.   Skin:   No rash or shingles   Psychiatric: She has a normal mood and affect. Judgment normal.         ED Course   Procedures  Labs Reviewed   COMPREHENSIVE METABOLIC PANEL - Abnormal       Result Value    Sodium 138      Potassium 4.4      Chloride 111 (*)     CO2 18 (*)     Glucose 109 (*)     Blood Urea Nitrogen 16.6      Creatinine 0.86      Calcium 8.9      Protein Total 6.7      Albumin 3.9      Globulin 2.8      Albumin/Globulin Ratio 1.4       Bilirubin Total 0.6      ALP 53      ALT 17      AST 15      eGFR >60      Anion Gap 9.0      BUN/Creatinine Ratio 19     URINALYSIS, REFLEX TO URINE CULTURE - Abnormal    Color, UA Light-Yellow      Appearance, UA Clear      Specific Gravity, UA 1.024      pH, UA 5.5      Protein, UA Negative      Glucose, UA Normal      Ketones, UA Negative      Blood, UA Negative      Bilirubin, UA Negative      Urobilinogen, UA Normal      Nitrites, UA Negative      Leukocyte Esterase, UA 25 (*)     RBC, UA 0-5      WBC, UA 0-5      Bacteria, UA None Seen      Squamous Epithelial Cells, UA Occasional (*)     Mucous, UA Trace (*)    CBC WITH DIFFERENTIAL - Abnormal    WBC 12.11 (*)     RBC 4.59      Hgb 14.2      Hct 40.4      MCV 88.0      MCH 30.9      MCHC 35.1      RDW 11.8      Platelet 313      MPV 9.3      Neut % 89.2      Lymph % 7.3      Mono % 2.9      Eos % 0.1      Basophil % 0.1      Lymph # 0.88      Neut # 10.81 (*)     Mono # 0.35      Eos # 0.01      Baso # 0.01      IG# 0.05 (*)     IG% 0.4      NRBC% 0.0     PREGNANCY TEST, URINE RAPID - Normal    hCG Qualitative, Urine Negative     CBC W/ AUTO DIFFERENTIAL    Narrative:     The following orders were created for panel order CBC auto differential.  Procedure                               Abnormality         Status                     ---------                               -----------         ------                     CBC with Differential[[id number]]       Abnormal            Final result                 Please view results for these tests on the individual orders.          Imaging Results              CT Abdomen Pelvis  Without Contrast (Final result)  Result time 11/29/24 13:48:48      Final result by Macey Castillo MD (11/29/24 13:48:48)                   Impression:      1. No acute abnormality of the abdomen or pelvis.  No obstructing urinary calculus.  2. Punctate bilateral nonobstructing renal calculi.      Electronically signed by: Macey  Anna  Date:    11/29/2024  Time:    13:48               Narrative:    EXAMINATION:  CT ABDOMEN PELVIS WITHOUT CONTRAST    CLINICAL HISTORY:  Flank pain, kidney stone suspected;left flank pain;    TECHNIQUE:  CT imaging was performed of the abdomen and pelvis without intravenous contrast. Dose length product is 666 mGycm. Automatic exposure control, adjustment of mA/kV or iterative reconstruction technique was used to limit radiation dose.    COMPARISON:  None    FINDINGS:  Assessment of the visceral organs and vasculature is limited by the lack of IV contrast.    Liver/biliary: No concerning hepatic findings. No radiodense gallstone or biliary dilatation appreciated.    Pancreas: Normal.    Spleen: Normal.    Adrenals: Normal.    Kidneys, ureters and bladder: There are punctate nonobstructing bilateral renal calculi.  There is no obstructing urinary calculus identified.  There is no hydronephrosis.  The bladder is within normal limits.    Reproductive organs: A 3.1 cm right adnexal cyst is likely physiologic.    Stomach/bowel: No evidence of bowel obstruction. Normal appendix. No discernible bowel inflammation.    Lymph nodes: No pathologically enlarged lymph node identified with noncontrast technique.    Peritoneum: No ascites or free air.    Vessels: Normal abdominal aortic caliber.    Abdominal wall: Normal.    Lung bases: No consolidation or pleural effusion.    Bones: No acute osseous findings.                                       X-Ray Chest 1 View (Final result)  Result time 11/29/24 11:49:37      Final result by Rai Coello MD (11/29/24 11:49:37)                   Impression:      No acute findings in the chest      Electronically signed by: Rai Coello MD  Date:    11/29/2024  Time:    11:49               Narrative:    EXAMINATION:  XR CHEST 1 VIEW    CLINICAL HISTORY:  flank pain;    COMPARISON:  None    FINDINGS:  Single view of the chest shows no focal consolidation, pneumothorax or pleural  effusion.  Cardiac silhouette and pulmonary vasculature are normal.                                       Medications   ketorolac injection 60 mg (60 mg Intramuscular Given 11/29/24 0927)   morphine injection 4 mg (4 mg Intravenous Given 11/29/24 1134)   ondansetron injection 4 mg (4 mg Intravenous Given 11/29/24 1134)     Medical Decision Making  Differential diagnosis includes but is not limited to pyelonephritis and kidney stones.    Amount and/or Complexity of Data Reviewed  Labs: ordered.    Risk  Prescription drug management.            Scribe Attestation:   Scribe #1: I performed the above scribed service and the documentation accurately describes the services I performed. I attest to the accuracy of the note.    Attending Attestation:           Physician Attestation for Scribe:  Physician Attestation Statement for Scribe #1: I, Efrain Moore MD, reviewed documentation, as scribed by Ray Ramos in my presence, and it is both accurate and complete.                                    Clinical Impression:  Final diagnoses:  [R10.9] Left flank pain (Primary)          ED Disposition Condition    Discharge Stable          ED Prescriptions       Medication Sig Dispense Start Date End Date Auth. Provider    meloxicam (MOBIC) 15 MG tablet Take 1 tablet (15 mg total) by mouth once daily. for 20 days 20 tablet 11/29/2024 12/19/2024 Efrain Moore MD    methocarbamoL (ROBAXIN) 500 MG Tab Take 2 tablets (1,000 mg total) by mouth 3 (three) times daily. for 5 days 30 tablet 11/29/2024 12/4/2024 Efrain Moore MD          Follow-up Information       Follow up With Specialties Details Why Contact Info    Ken Noriega MD Family Medicine In 2 days  [address]  [facility] [address] [phone]               Efrain Moore MD  11/29/24 5517